# Patient Record
Sex: MALE | Race: WHITE | Employment: UNEMPLOYED | URBAN - METROPOLITAN AREA
[De-identification: names, ages, dates, MRNs, and addresses within clinical notes are randomized per-mention and may not be internally consistent; named-entity substitution may affect disease eponyms.]

---

## 2021-12-17 ENCOUNTER — APPOINTMENT (OUTPATIENT)
Dept: GENERAL RADIOLOGY | Age: 46
End: 2021-12-17
Payer: COMMERCIAL

## 2021-12-17 ENCOUNTER — HOSPITAL ENCOUNTER (EMERGENCY)
Age: 46
Discharge: OTHER INSTITUTION | End: 2021-12-17
Payer: COMMERCIAL

## 2021-12-17 VITALS
OXYGEN SATURATION: 99 % | BODY MASS INDEX: 28.6 KG/M2 | WEIGHT: 230 LBS | SYSTOLIC BLOOD PRESSURE: 134 MMHG | RESPIRATION RATE: 14 BRPM | HEART RATE: 71 BPM | HEIGHT: 75 IN | TEMPERATURE: 97 F | DIASTOLIC BLOOD PRESSURE: 89 MMHG

## 2021-12-17 DIAGNOSIS — S62.336A CLOSED DISPLACED FRACTURE OF NECK OF FIFTH METACARPAL BONE OF RIGHT HAND, INITIAL ENCOUNTER: ICD-10-CM

## 2021-12-17 DIAGNOSIS — S52.021A CLOSED OLECRANON FRACTURE, RIGHT, INITIAL ENCOUNTER: Primary | ICD-10-CM

## 2021-12-17 DIAGNOSIS — M25.421 EFFUSION, RIGHT ELBOW: ICD-10-CM

## 2021-12-17 DIAGNOSIS — S52.001A CLOSED FRACTURE OF PROXIMAL END OF RIGHT ULNA, UNSPECIFIED FRACTURE MORPHOLOGY, INITIAL ENCOUNTER: ICD-10-CM

## 2021-12-17 PROCEDURE — 99283 EMERGENCY DEPT VISIT LOW MDM: CPT

## 2021-12-17 PROCEDURE — 73080 X-RAY EXAM OF ELBOW: CPT

## 2021-12-17 PROCEDURE — 6370000000 HC RX 637 (ALT 250 FOR IP): Performed by: PHYSICIAN ASSISTANT

## 2021-12-17 PROCEDURE — 29105 APPLICATION LONG ARM SPLINT: CPT

## 2021-12-17 PROCEDURE — 73130 X-RAY EXAM OF HAND: CPT

## 2021-12-17 RX ORDER — ACETAMINOPHEN 325 MG/1
650 TABLET ORAL ONCE
Status: COMPLETED | OUTPATIENT
Start: 2021-12-17 | End: 2021-12-17

## 2021-12-17 RX ORDER — IBUPROFEN 800 MG/1
800 TABLET ORAL ONCE
Status: COMPLETED | OUTPATIENT
Start: 2021-12-17 | End: 2021-12-17

## 2021-12-17 RX ADMIN — IBUPROFEN 800 MG: 800 TABLET, FILM COATED ORAL at 21:23

## 2021-12-17 RX ADMIN — ACETAMINOPHEN 650 MG: 325 TABLET ORAL at 21:23

## 2021-12-17 ASSESSMENT — PAIN DESCRIPTION - LOCATION: LOCATION: ELBOW

## 2021-12-17 ASSESSMENT — PAIN SCALES - GENERAL: PAINLEVEL_OUTOF10: 7

## 2021-12-17 ASSESSMENT — PAIN DESCRIPTION - PAIN TYPE: TYPE: ACUTE PAIN

## 2021-12-17 ASSESSMENT — PAIN DESCRIPTION - ORIENTATION: ORIENTATION: RIGHT

## 2021-12-18 NOTE — ED NOTES
Perfect serve was placed to Dr. Cecilia Cox. He responded with he was not on call for [facility]. A call was then placed to [address] [place] [address]. Answering service states Dr. Bernabe Rangel is on call will page him.      Rosina Calderon  12/17/21 2967

## 2021-12-18 NOTE — ED NOTES
OCL and sling applied to patient's right arm     Minor Ghotra  12/17/21 [facility]  12/17/21 0014

## 2021-12-18 NOTE — ED PROVIDER NOTES
[address] ED  EMERGENCY DEPARTMENT ENCOUNTER        Pt Name: Lashonda Ivy  MRN: [id number]  [place] 1975  Date of evaluation: 12/17/2021  Provider: Amparo Jenkins PA-C  PCP: Samira Montanez MD    Shared Visit or Autonomous Visit: YUKI. I have evaluated this patient. My supervising physician was available for consultation. CHIEF COMPLAINT       Chief Complaint   Patient presents with    Elbow Injury     from snf, fell yesterday playing basketball. xray comfirms fracture at the snf, here for a splint, right elbow       HISTORY OF PRESENT ILLNESS   (Location/Symptom, Timing/Onset, Context/Setting, Quality, Duration, Modifying Factors, Severity)  Note limiting factors. Lashonda Ivy is a 55 y.o. male presenting to the emergency department for evaluation of right elbow and right hand injury that occurred yesterday after he fell while playing basketball. He is from the snf arrived with officer. Has a large amount of swelling to the posterior elbow. Bruising. No open wounds. Also tenderness bruising swelling to the right hand over fifth metacarpal with deformity. Neurovascularly intact. The history is provided by the patient. Arm Injury  Location:  Elbow and hand  Elbow location:  R elbow  Hand location:  R hand  Injury: yes    Time since incident:  1 day  Mechanism of injury: fall    Pain details:     Onset quality:  Sudden  Worsened by: Movement (plapation)  Associated symptoms: swelling    Associated symptoms: no fever    Hand Problem  Location:  Hand  Hand location:  R hand  Injury: yes    Pain details:     Onset quality:  Sudden    Duration:  1 day  Associated symptoms: swelling    Associated symptoms: no fever        Nursing Notes were reviewed    REVIEW OF SYSTEMS    (2-9 systems for level 4, 10 or more for level 5)     Review of Systems   Constitutional: Negative for fever. Musculoskeletal:        Rt elbow and rt hand pain   Skin: Negative for wound. Neurological: Negative for numbness. Positives and Pertinent negatives as per HPI. PAST MEDICAL HISTORY   History reviewed. No pertinent past medical history. SURGICAL HISTORY   History reviewed. No pertinent surgical history. CURRENTMEDICATIONS       There are no discharge medications for this patient. ALLERGIES     Patient has no known allergies. FAMILYHISTORY     History reviewed. No pertinent family history. SOCIAL HISTORY       Social History     Socioeconomic History    Marital status:      Spouse name: None    Number of children: None    Years of education: None    Highest education level: None   Occupational History    None   Tobacco Use    Smoking status: Current Every Day Smoker     Packs/day: 1.00    Smokeless tobacco: None   Substance and Sexual Activity    Alcohol use: None    Drug use: None    Sexual activity: None   Other Topics Concern    None   Social History Narrative    None     Social Determinants of Health     Financial Resource Strain:     Difficulty of Paying Living Expenses: Not on file   Food Insecurity:     Worried About Running Out of Food in the Last Year: Not on file    Fuad of Food in the Last Year: Not on file   Transportation Needs:     Lack of Transportation (Medical): Not on file    Lack of Transportation (Non-Medical):  Not on file   Physical Activity:     Days of Exercise per Week: Not on file    Minutes of Exercise per Session: Not on file   Stress:     Feeling of Stress : Not on file   Social Connections:     Frequency of Communication with Friends and Family: Not on file    Frequency of Social Gatherings with Friends and Family: Not on file    Attends Latter day Services: Not on file    Active Member of Clubs or Organizations: Not on file    Attends Club or Organization Meetings: Not on file    Marital Status: Not on file   Intimate Partner Violence:     Fear of Current or Ex-Partner: Not on file   [employer] dictation. EKG: All EKG's are interpreted by the Emergency Department Physician in the absence of a cardiologist.  Please see their note for interpretation of EKG. RADIOLOGY:   Non-plain film images such as CT, Ultrasound and MRI are read by the radiologist. Plain radiographic images are visualized andpreliminarily interpreted by the  ED Provider with the below findings:        Interpretation perthe Radiologist below, if available at the time of this note:    XR HAND RIGHT (MIN 3 VIEWS)   Final Result   Traumatic, acute intra-articular fracture of the proximal ulna with mild   displacement. Traumatic, acute comminuted fracture of the 5th metacarpal neck/shaft. XR ELBOW RIGHT (MIN 3 VIEWS)   Final Result   Traumatic, acute intra-articular fracture of the proximal ulna with mild   displacement. Traumatic, acute comminuted fracture of the 5th metacarpal neck/shaft. XR ELBOW RIGHT (MIN 3 VIEWS)    Result Date: 12/17/2021  EXAMINATION: THREE XRAY VIEWS OF THE RIGHT ELBOW; THREE XRAY VIEWS OF THE RIGHT HAND 12/17/2021 8:53 pm COMPARISON: None. HISTORY: ORDERING SYSTEM PROVIDED HISTORY: trauma TECHNOLOGIST PROVIDED HISTORY: Reason for exam:->trauma Reason for Exam: playing basket ball injury FINDINGS: Right elbow- There is a fracture of the olecranon process with diastasis of approximately 8 mm. Elbow joint effusion is noted. Posterior soft tissue swelling is also present. Right hand- There is a comminuted fracture of the 5th metacarpal neck and distal shaft. There is mild apex volar angulation. Traumatic, acute intra-articular fracture of the proximal ulna with mild displacement. Traumatic, acute comminuted fracture of the 5th metacarpal neck/shaft. XR HAND RIGHT (MIN 3 VIEWS)    Result Date: 12/17/2021  EXAMINATION: THREE XRAY VIEWS OF THE RIGHT ELBOW; THREE XRAY VIEWS OF THE RIGHT HAND 12/17/2021 8:53 pm COMPARISON: None.  HISTORY: ORDERING SYSTEM PROVIDED HISTORY: trauma TECHNOLOGIST PROVIDED HISTORY: Reason for exam:->trauma Reason for Exam: playing basket ball injury FINDINGS: Right elbow- There is a fracture of the olecranon process with diastasis of approximately 8 mm. Elbow joint effusion is noted. Posterior soft tissue swelling is also present. Right hand- There is a comminuted fracture of the 5th metacarpal neck and distal shaft. There is mild apex volar angulation. Traumatic, acute intra-articular fracture of the proximal ulna with mild displacement. Traumatic, acute comminuted fracture of the 5th metacarpal neck/shaft. PROCEDURES   Unless otherwise noted below, none     Procedures    CRITICAL CARE TIME   N/A    CONSULTS:  None      EMERGENCY DEPARTMENT COURSE and DIFFERENTIAL DIAGNOSIS/MDM:   Vitals:    Vitals:    12/17/21 2028   BP: 134/89   Pulse: 71   Resp: 14   Temp: 97 °F (36.1 °C)   TempSrc: Temporal   SpO2: 99%   Weight: 230 lb (104.3 kg)   Height: 6' 3\" (1.905 m)       Patient was given thefollowing medications:  Medications   ibuprofen (ADVIL;MOTRIN) tablet 800 mg (800 mg Oral Given 12/17/21 2123)   acetaminophen (TYLENOL) tablet 650 mg (650 mg Oral Given 12/17/21 2123)         ED course  Patient presented to the ER for evaluation of right arm injury. He has large amount of swelling to the posterior elbow with bruise. No open wounds. Neurovascularly intact. He also has injury to his right hand with swelling and deformity over fifth metacarpal.  Closed. X-ray showing olecranon fracture with approximately 8 mm diastases. And comminuted fracture fifth metacarpal.    10:46 PM EST  Spoke with orthopedics on-call have reviewed films recommended padded posterior splint and sling and follow-up with Dr. Jacqueline Grace or Mala Beltran. Splint was applied by ER tech this was checked afterwards is in good position and neurovascular intact. Referral provided for follow-up. Advise rest ice and elevate Tylenol and ibuprofen for pain return for worsening.       I estimate there is LOW risk for COMPARTMENT SYNDROME, DEEP VENOUS THROMBOSIS, SEPTIC ARTHRITIS, TENDON OR NEUROVASCULAR INJURY, thus I consider the discharge disposition reasonable. FINAL IMPRESSION      1. Closed olecranon fracture, right, initial encounter    2. Closed displaced fracture of neck of fifth metacarpal bone of right hand, initial encounter    3. Effusion, right elbow    4. Closed fracture of proximal end of right ulna, unspecified fracture morphology, initial encounter          DISPOSITION/PLAN   DISPOSITION Decision to Discharge    PATIENT REFERREDTO:  Regina Savage MD  [place] [address]  [phone]    Schedule an appointment as soon as possible for a visit   [facility] orthopedics    [facility] ED  [address]  [phone]    If symptoms worsen      DISCHARGE MEDICATIONS:  There are no discharge medications for this patient. DISCONTINUED MEDICATIONS:  There are no discharge medications for this patient.              (Please note that portions ofthis note were completed with a voice recognition program.  Efforts were made to edit the dictations but occasionally words are mis-transcribed.)    Nicki Reid PA-C (electronically signed)            Radha Banegas PA-C  12/18/21 5009

## 2021-12-20 ENCOUNTER — TELEPHONE (OUTPATIENT)
Dept: ORTHOPEDIC SURGERY | Age: 46
End: 2021-12-20

## 2021-12-20 NOTE — TELEPHONE ENCOUNTER
Patient is currently in half-way as of ER note and SMA. We are trying to reach patient to schedule. Tried to call emergency contact but number was not reachable as it is not in service. **Upon call from half-way. Please schedule patient for SMA 12/21/2021 or 12/23/2021 at 4 pm (okay to overbook).  **

## 2021-12-23 ENCOUNTER — OFFICE VISIT (OUTPATIENT)
Dept: ORTHOPEDIC SURGERY | Age: 46
End: 2021-12-23
Payer: COMMERCIAL

## 2021-12-23 VITALS — HEIGHT: 75 IN | BODY MASS INDEX: 28.6 KG/M2 | WEIGHT: 230 LBS

## 2021-12-23 DIAGNOSIS — S52.021A OLECRANON FRACTURE, RIGHT, CLOSED, INITIAL ENCOUNTER: Primary | ICD-10-CM

## 2021-12-23 PROCEDURE — 99244 OFF/OP CNSLTJ NEW/EST MOD 40: CPT | Performed by: ORTHOPAEDIC SURGERY

## 2021-12-23 NOTE — PATIENT INSTRUCTIONS
You are to be scheduled for surgery. My Patient , Chaitanya Pearce will be facilitating the scheduling process. You may continue normal activities up until the time of your surgery. Please call Shaw Terrazasy at [phone] if you have any questions regarding the scheduled procedure.

## 2021-12-23 NOTE — PROGRESS NOTES
Occupational History    None   Tobacco Use    Smoking status: Current Every Day Smoker     Packs/day: 1.00    Smokeless tobacco: Never Used   Substance and Sexual Activity    Alcohol use: None    Drug use: None    Sexual activity: None   Other Topics Concern    None   Social History Narrative    None     Social Determinants of Health     Financial Resource Strain:     Difficulty of Paying Living Expenses: Not on file   Food Insecurity:     Worried About Running Out of Food in the Last Year: Not on file    Fuad of Food in the Last Year: Not on file   Transportation Needs:     Lack of Transportation (Medical): Not on file    Lack of Transportation (Non-Medical):  Not on file   Physical Activity:     Days of Exercise per Week: Not on file    Minutes of Exercise per Session: Not on file   Stress:     Feeling of Stress : Not on file   Social Connections:     Frequency of Communication with Friends and Family: Not on file    Frequency of Social Gatherings with Friends and Family: Not on file    Attends Evangelical Services: Not on file    Active Member of [address] [place] [address] or Organizations: Not on file    Attends Club or Organization Meetings: Not on file    Marital Status: Not on file   Intimate Partner Violence:     Fear of Current or Ex-Partner: Not on file    Emotionally Abused: Not on file    Physically Abused: Not on file    Sexually Abused: Not on file   Housing Stability:     Unable to Pay for Housing in the Last Year: Not on file    Number of [place] in the Last Year: Not on file    Unstable Housing in the Last Year: Not on file       REVIEW OF SYSTEMS   General: no fever, chills, night sweats, anorexia, malaise, fatigue, or weight change  Hematologic:  no unexplained bleeding or bruising  HEENT:   no nasal congestion, rhinorrhea, sore throat, or facial pain  Respiratory:  no cough, dyspnea, or chest pain  Cardiovascular:  no angina, PETERSEN, PND, orthopnea, dependent edema, or palpitations  Gastrointestinal:  no nausea, vomiting, diarrhea, constipation, or abdominal pain  Genitourinary:  no urinary urgency, frequency, dysuria, or hematuria  Musculoskeletal: see HPI  Endocrine:  no heat or cold intolerance and no polyphagia, polydipsia, or polyuria  Skin:  no skin eruptions or changing lesions  Neurologic:  no focal weakness, numbness/tingling, tremor, or severe headache. See HPI. See HPI for pertinent positives. PHYSICAL EXAM   Vital Signs:   Vitals:    12/23/21 1318   Weight: 230 lb (104.3 kg)   Height: 6' 3\" (1.905 m)       General appearance: healthy, alert, no distress  Skin: Skin color, texture, turgor normal. No rashes or lesions  HEENT: atraumatic, normocephalic. PERRL  Respiratory: Unlabored breathing  Lymphatic: No adenopathy   Neuro: Alert and oriented, normal distal sensation, normal bilateral DTRs  Vascular: Normal distal capillary and distal pulses  Muskuloskeletal Exam: Right upper extremity examination feels diffuse soft tissue swelling and ecchymosis with tenderness at the olecranon process and a palpable defect at the known fracture site. He has some mild boxer fracture deformity of the right hand without tenderness to palpation in the 5th metacarpal neck    RADIOLOGY   X-rays obtained and reviewed in office:  Views right elbow 3 views and right hand 3 views    Impression: There is an acute displaced fracture of the olecranon process with proximal displacement of about 1 cm. There is a subacute fracture of the 5th metacarpal neck with mild angulation and abundant periosteal new bone and callus formation     IMPRESSION     1. Olecranon fracture, right, closed, initial encounter         PLAN   I had a lengthy discussion with patient today regarding diagnosis and treatment options and recommendations. I believe the olecranon fracture will best be treated with open reduction and internal fixation.   We discussed the risk benefits complications possible outcomes and expected postoperative course. He understands would like to proceed. He was placed back in a long-arm posterior splint for temporary immobilization. We will make arrangements for definitive fracture management in the OR in the future. FOLLOWUP     Return for first post operative visit. No orders of the defined types were placed in this encounter. No orders of the defined types were placed in this encounter.       Patient was instructed on appropriate use of braces, participation in home exercise programs, healthy lifestyle choices and weight loss as appropriate     Tiara Adames MD

## 2021-12-23 NOTE — LETTER
[address] [employer]   DR. DAVID CASTREJON     TODAY'S DATE: 21    PATIENT'S NAME: Lazaro Fan    PATIENT'S NUMBER: <[id number]>    : 1975    PREFERRED PHONE NUMBER: [phone]      WORK PHONE NUMBER: There is no work phone number on file. DIAGNOSIS:   1.  Olecranon fracture, right, closed, initial encounter        PROCEDURE: Open reduction internal fixation right olecranon process fracture    SURGEON: Dr. Pierce Douds: Urgent    HOSPITAL: [facility]: Outpatient/Same Day Surgery    ANESTHESIA: General   Pre-Op Block requested  Yes    LENGTH OF SURGERY: 1 hour    EQUIPMENT REQUESTED: Overwatch proximal ulna plate/hook plate, Overwatch locking small fragment set and Overwatch cannulated screws      X-RAYS REQUIRED: C-ARM      PCP: Hi Truong MD    H&P TO BE DONE BY THE PCP      CARDIAC CLEARANCE NEEDED: No     ALLERGIES: No Known Allergies    DME: none    POST-OP VISIT: 10 Days    OTHER IN[facility]REMARKS[facility]    INSURANCE INFORMATION: _________________________ CARD IN MEDIA IN EPIC___  CARD FAXED___    PRE-CERTIFICATION REQUIRED: YES   NO   PER _______________________    SURGERY SCHEDULED BY: ____________________________________    PATIENT CALLED AND CONFIRMED DATE & TIME: ______________________

## 2021-12-23 NOTE — LETTER
[address]   INFORMED CONSENT FOR SURGICAL PROCEDURES     I, Lazaro Fan, hereby authorize Dr. Bevely Felty and/or such associates as may be selected by him/her to perform the following operations:   Open reduction internal fixation right olecranon process fracture    I hereby acknowledge that in giving this consent the nature and purpose of the procedure, alternative means of therapy, if any, and reasonably known risks, complications, and discomfort have been explained to me by the above named doctor(s). I authorize the above named doctor(s) to carry out an extension or to perform any other procedure that in her judgement is advisable on the basis of findings during the course of said operation upon the above named patient. I am aware that the practice of medicine and surgery is not an exact science and that the possibility and nature of results or complications cannot be anticipated with complete accuracy. I acknowledge that no guarantees, express or implied, have been made as to the results of the above described procedure or any cure. I acknowledge that disclosure of information has been made to me regarding the nature and purpose of the operation together with the reasonably known risks and that all questions I have asked about the procedure have been answered in a satisfactory manner.           Dr. Bevely Felty       __________________________________  Patient/Guardian or Spouse Signature     __________________________________  Witness   12/23/21 1:54 PM

## 2022-01-07 ENCOUNTER — TELEPHONE (OUTPATIENT)
Dept: ORTHOPEDIC SURGERY | Age: 47
End: 2022-01-07

## 2022-01-07 NOTE — TELEPHONE ENCOUNTER
Patient is currently an inmate at [facility]. There is no way to contact this patient regarding insurance. Precert made aware.  KB

## 2022-01-07 NOTE — TELEPHONE ENCOUNTER
PER PSS, PATIENT IS A [facility] INMATE  CANNOT OBTAIN INS CARD  [id number]  EG  NO PRECERT COULD BE OBTAINED

## 2022-01-10 ENCOUNTER — ANESTHESIA EVENT (OUTPATIENT)
Dept: OPERATING ROOM | Age: 47
End: 2022-01-10
Payer: COMMERCIAL

## 2022-01-11 ENCOUNTER — TELEPHONE (OUTPATIENT)
Dept: ORTHOPEDIC SURGERY | Age: 47
End: 2022-01-11

## 2022-01-12 ENCOUNTER — HOSPITAL ENCOUNTER (OUTPATIENT)
Age: 47
Setting detail: OUTPATIENT SURGERY
Discharge: HOME | End: 2022-01-12
Attending: ORTHOPAEDIC SURGERY | Admitting: ORTHOPAEDIC SURGERY
Payer: COMMERCIAL

## 2022-01-12 ENCOUNTER — ANESTHESIA (OUTPATIENT)
Dept: OPERATING ROOM | Age: 47
End: 2022-01-12
Payer: COMMERCIAL

## 2022-01-12 VITALS
OXYGEN SATURATION: 96 % | RESPIRATION RATE: 18 BRPM | BODY MASS INDEX: 28.6 KG/M2 | WEIGHT: 230 LBS | TEMPERATURE: 98.7 F | HEART RATE: 68 BPM | HEIGHT: 75 IN | DIASTOLIC BLOOD PRESSURE: 60 MMHG | SYSTOLIC BLOOD PRESSURE: 100 MMHG

## 2022-01-12 VITALS
SYSTOLIC BLOOD PRESSURE: 87 MMHG | TEMPERATURE: 97.9 F | RESPIRATION RATE: 13 BRPM | DIASTOLIC BLOOD PRESSURE: 54 MMHG | OXYGEN SATURATION: 99 %

## 2022-01-12 DIAGNOSIS — S52.021A OLECRANON FRACTURE, RIGHT, CLOSED, INITIAL ENCOUNTER: Primary | ICD-10-CM

## 2022-01-12 PROCEDURE — 64415 NJX AA&/STRD BRCH PLXS IMG: CPT | Performed by: ANESTHESIOLOGY

## 2022-01-12 PROCEDURE — 2500000003 HC RX 250 WO HCPCS: Performed by: NURSE ANESTHETIST, CERTIFIED REGISTERED

## 2022-01-12 PROCEDURE — 2500000003 HC RX 250 WO HCPCS: Performed by: ANESTHESIOLOGY

## 2022-01-12 PROCEDURE — 3600000014 HC SURGERY LEVEL 4 ADDTL 15MIN: Performed by: ORTHOPAEDIC SURGERY

## 2022-01-12 PROCEDURE — 2709999900 HC NON-CHARGEABLE SUPPLY: Performed by: ORTHOPAEDIC SURGERY

## 2022-01-12 PROCEDURE — 2720000010 HC SURG SUPPLY STERILE: Performed by: ORTHOPAEDIC SURGERY

## 2022-01-12 PROCEDURE — 3700000001 HC ADD 15 MINUTES (ANESTHESIA): Performed by: ORTHOPAEDIC SURGERY

## 2022-01-12 PROCEDURE — 3700000000 HC ANESTHESIA ATTENDED CARE: Performed by: ORTHOPAEDIC SURGERY

## 2022-01-12 PROCEDURE — 24685 OPTX ULNAR FX PROX END W/FIX: CPT | Performed by: ORTHOPAEDIC SURGERY

## 2022-01-12 PROCEDURE — 6360000002 HC RX W HCPCS: Performed by: ORTHOPAEDIC SURGERY

## 2022-01-12 PROCEDURE — 3600000004 HC SURGERY LEVEL 4 BASE: Performed by: ORTHOPAEDIC SURGERY

## 2022-01-12 PROCEDURE — 7100000011 HC PHASE II RECOVERY - ADDTL 15 MIN: Performed by: ORTHOPAEDIC SURGERY

## 2022-01-12 PROCEDURE — C1776 JOINT DEVICE (IMPLANTABLE): HCPCS | Performed by: ORTHOPAEDIC SURGERY

## 2022-01-12 PROCEDURE — 6360000002 HC RX W HCPCS: Performed by: NURSE ANESTHETIST, CERTIFIED REGISTERED

## 2022-01-12 PROCEDURE — C1713 ANCHOR/SCREW BN/BN,TIS/BN: HCPCS | Performed by: ORTHOPAEDIC SURGERY

## 2022-01-12 PROCEDURE — C1769 GUIDE WIRE: HCPCS | Performed by: ORTHOPAEDIC SURGERY

## 2022-01-12 PROCEDURE — 7100000001 HC PACU RECOVERY - ADDTL 15 MIN: Performed by: ORTHOPAEDIC SURGERY

## 2022-01-12 PROCEDURE — 7100000000 HC PACU RECOVERY - FIRST 15 MIN: Performed by: ORTHOPAEDIC SURGERY

## 2022-01-12 PROCEDURE — 7100000010 HC PHASE II RECOVERY - FIRST 15 MIN: Performed by: ORTHOPAEDIC SURGERY

## 2022-01-12 PROCEDURE — 2580000003 HC RX 258: Performed by: ANESTHESIOLOGY

## 2022-01-12 DEVICE — IMPLANTABLE DEVICE: Type: IMPLANTABLE DEVICE | Site: ELBOW | Status: FUNCTIONAL

## 2022-01-12 DEVICE — WASHER ORTH DIA13MM FOR CANN SCR: Type: IMPLANTABLE DEVICE | Site: ELBOW | Status: FUNCTIONAL

## 2022-01-12 RX ORDER — FENTANYL CITRATE 50 UG/ML
INJECTION, SOLUTION INTRAMUSCULAR; INTRAVENOUS PRN
Status: DISCONTINUED | OUTPATIENT
Start: 2022-01-12 | End: 2022-01-12 | Stop reason: SDUPTHER

## 2022-01-12 RX ORDER — FENTANYL CITRATE 50 UG/ML
INJECTION, SOLUTION INTRAMUSCULAR; INTRAVENOUS
Status: DISCONTINUED
Start: 2022-01-12 | End: 2022-01-12 | Stop reason: HOSPADM

## 2022-01-12 RX ORDER — MORPHINE SULFATE 2 MG/ML
2 INJECTION, SOLUTION INTRAMUSCULAR; INTRAVENOUS EVERY 5 MIN PRN
Status: DISCONTINUED | OUTPATIENT
Start: 2022-01-12 | End: 2022-01-12 | Stop reason: HOSPADM

## 2022-01-12 RX ORDER — SODIUM CHLORIDE 0.9 % (FLUSH) 0.9 %
10 SYRINGE (ML) INJECTION EVERY 12 HOURS SCHEDULED
Status: DISCONTINUED | OUTPATIENT
Start: 2022-01-12 | End: 2022-01-12 | Stop reason: HOSPADM

## 2022-01-12 RX ORDER — BUPIVACAINE HYDROCHLORIDE 2.5 MG/ML
INJECTION, SOLUTION EPIDURAL; INFILTRATION; INTRACAUDAL
Status: DISCONTINUED
Start: 2022-01-12 | End: 2022-01-12 | Stop reason: WASHOUT

## 2022-01-12 RX ORDER — DEXAMETHASONE SODIUM PHOSPHATE 4 MG/ML
INJECTION, SOLUTION INTRA-ARTICULAR; INTRALESIONAL; INTRAMUSCULAR; INTRAVENOUS; SOFT TISSUE PRN
Status: DISCONTINUED | OUTPATIENT
Start: 2022-01-12 | End: 2022-01-12 | Stop reason: SDUPTHER

## 2022-01-12 RX ORDER — BUPIVACAINE HYDROCHLORIDE 5 MG/ML
INJECTION, SOLUTION EPIDURAL; INTRACAUDAL
Status: DISCONTINUED
Start: 2022-01-12 | End: 2022-01-12 | Stop reason: HOSPADM

## 2022-01-12 RX ORDER — OXYCODONE HYDROCHLORIDE AND ACETAMINOPHEN 5; 325 MG/1; MG/1
2 TABLET ORAL PRN
Status: DISCONTINUED | OUTPATIENT
Start: 2022-01-12 | End: 2022-01-12 | Stop reason: HOSPADM

## 2022-01-12 RX ORDER — MORPHINE SULFATE 2 MG/ML
1 INJECTION, SOLUTION INTRAMUSCULAR; INTRAVENOUS EVERY 5 MIN PRN
Status: DISCONTINUED | OUTPATIENT
Start: 2022-01-12 | End: 2022-01-12 | Stop reason: HOSPADM

## 2022-01-12 RX ORDER — HYDRALAZINE HYDROCHLORIDE 20 MG/ML
5 INJECTION INTRAMUSCULAR; INTRAVENOUS EVERY 10 MIN PRN
Status: DISCONTINUED | OUTPATIENT
Start: 2022-01-12 | End: 2022-01-12 | Stop reason: HOSPADM

## 2022-01-12 RX ORDER — MIDAZOLAM HYDROCHLORIDE 1 MG/ML
INJECTION INTRAMUSCULAR; INTRAVENOUS PRN
Status: DISCONTINUED | OUTPATIENT
Start: 2022-01-12 | End: 2022-01-12 | Stop reason: SDUPTHER

## 2022-01-12 RX ORDER — LABETALOL HYDROCHLORIDE 5 MG/ML
5 INJECTION, SOLUTION INTRAVENOUS EVERY 10 MIN PRN
Status: DISCONTINUED | OUTPATIENT
Start: 2022-01-12 | End: 2022-01-12 | Stop reason: HOSPADM

## 2022-01-12 RX ORDER — GLYCOPYRROLATE 0.2 MG/ML
INJECTION INTRAMUSCULAR; INTRAVENOUS PRN
Status: DISCONTINUED | OUTPATIENT
Start: 2022-01-12 | End: 2022-01-12 | Stop reason: SDUPTHER

## 2022-01-12 RX ORDER — SODIUM CHLORIDE, SODIUM LACTATE, POTASSIUM CHLORIDE, CALCIUM CHLORIDE 600; 310; 30; 20 MG/100ML; MG/100ML; MG/100ML; MG/100ML
INJECTION, SOLUTION INTRAVENOUS CONTINUOUS
Status: DISCONTINUED | OUTPATIENT
Start: 2022-01-12 | End: 2022-01-12 | Stop reason: HOSPADM

## 2022-01-12 RX ORDER — SODIUM CHLORIDE 0.9 % (FLUSH) 0.9 %
10 SYRINGE (ML) INJECTION PRN
Status: DISCONTINUED | OUTPATIENT
Start: 2022-01-12 | End: 2022-01-12 | Stop reason: HOSPADM

## 2022-01-12 RX ORDER — PROPOFOL 10 MG/ML
INJECTION, EMULSION INTRAVENOUS PRN
Status: DISCONTINUED | OUTPATIENT
Start: 2022-01-12 | End: 2022-01-12 | Stop reason: SDUPTHER

## 2022-01-12 RX ORDER — PROMETHAZINE HYDROCHLORIDE 25 MG/ML
6.25 INJECTION, SOLUTION INTRAMUSCULAR; INTRAVENOUS
Status: DISCONTINUED | OUTPATIENT
Start: 2022-01-12 | End: 2022-01-12 | Stop reason: SDUPTHER

## 2022-01-12 RX ORDER — DIPHENHYDRAMINE HYDROCHLORIDE 50 MG/ML
12.5 INJECTION INTRAMUSCULAR; INTRAVENOUS
Status: DISCONTINUED | OUTPATIENT
Start: 2022-01-12 | End: 2022-01-12 | Stop reason: HOSPADM

## 2022-01-12 RX ORDER — MIDAZOLAM HYDROCHLORIDE 1 MG/ML
INJECTION INTRAMUSCULAR; INTRAVENOUS
Status: DISCONTINUED
Start: 2022-01-12 | End: 2022-01-12 | Stop reason: HOSPADM

## 2022-01-12 RX ORDER — HYDROCODONE BITARTRATE AND ACETAMINOPHEN 5; 325 MG/1; MG/1
1 TABLET ORAL EVERY 4 HOURS PRN
Qty: 42 TABLET | Refills: 0 | Status: SHIPPED | OUTPATIENT
Start: 2022-01-12 | End: 2022-01-19

## 2022-01-12 RX ORDER — LIDOCAINE HYDROCHLORIDE 20 MG/ML
INJECTION, SOLUTION EPIDURAL; INFILTRATION; INTRACAUDAL; PERINEURAL PRN
Status: DISCONTINUED | OUTPATIENT
Start: 2022-01-12 | End: 2022-01-12 | Stop reason: SDUPTHER

## 2022-01-12 RX ORDER — OXYCODONE HYDROCHLORIDE AND ACETAMINOPHEN 5; 325 MG/1; MG/1
1 TABLET ORAL PRN
Status: DISCONTINUED | OUTPATIENT
Start: 2022-01-12 | End: 2022-01-12 | Stop reason: HOSPADM

## 2022-01-12 RX ORDER — SODIUM CHLORIDE 9 MG/ML
25 INJECTION, SOLUTION INTRAVENOUS PRN
Status: DISCONTINUED | OUTPATIENT
Start: 2022-01-12 | End: 2022-01-12 | Stop reason: HOSPADM

## 2022-01-12 RX ORDER — ONDANSETRON 2 MG/ML
4 INJECTION INTRAMUSCULAR; INTRAVENOUS PRN
Status: DISCONTINUED | OUTPATIENT
Start: 2022-01-12 | End: 2022-01-12 | Stop reason: HOSPADM

## 2022-01-12 RX ADMIN — PROPOFOL 200 MG: 10 INJECTION, EMULSION INTRAVENOUS at 08:28

## 2022-01-12 RX ADMIN — FENTANYL CITRATE 100 MCG: 50 INJECTION INTRAMUSCULAR; INTRAVENOUS at 08:28

## 2022-01-12 RX ADMIN — LIDOCAINE HYDROCHLORIDE 60 MG: 20 INJECTION, SOLUTION EPIDURAL; INFILTRATION; INTRACAUDAL; PERINEURAL at 08:28

## 2022-01-12 RX ADMIN — Medication 2000 MG: at 08:35

## 2022-01-12 RX ADMIN — FENTANYL CITRATE 100 MCG: 50 INJECTION INTRAMUSCULAR; INTRAVENOUS at 07:56

## 2022-01-12 RX ADMIN — MIDAZOLAM 2 MG: 1 INJECTION INTRAMUSCULAR; INTRAVENOUS at 08:28

## 2022-01-12 RX ADMIN — SODIUM CHLORIDE, POTASSIUM CHLORIDE, SODIUM LACTATE AND CALCIUM CHLORIDE: 600; 310; 30; 20 INJECTION, SOLUTION INTRAVENOUS at 07:22

## 2022-01-12 RX ADMIN — PHENYLEPHRINE HYDROCHLORIDE 100 MCG: 10 INJECTION INTRAVENOUS at 09:02

## 2022-01-12 RX ADMIN — DEXAMETHASONE SODIUM PHOSPHATE 8 MG: 4 INJECTION, SOLUTION INTRAMUSCULAR; INTRAVENOUS at 08:56

## 2022-01-12 RX ADMIN — MIDAZOLAM 2 MG: 1 INJECTION INTRAMUSCULAR; INTRAVENOUS at 07:56

## 2022-01-12 RX ADMIN — GLYCOPYRROLATE 0.2 MG: 0.2 INJECTION, SOLUTION INTRAMUSCULAR; INTRAVENOUS at 08:47

## 2022-01-12 RX ADMIN — PHENYLEPHRINE HYDROCHLORIDE 100 MCG: 10 INJECTION INTRAVENOUS at 08:56

## 2022-01-12 RX ADMIN — PHENYLEPHRINE HYDROCHLORIDE 100 MCG: 10 INJECTION INTRAVENOUS at 08:49

## 2022-01-12 RX ADMIN — FAMOTIDINE 20 MG: 10 INJECTION, SOLUTION INTRAVENOUS at 07:23

## 2022-01-12 RX ADMIN — LIDOCAINE HYDROCHLORIDE 0.1 ML: 10 INJECTION, SOLUTION EPIDURAL; INFILTRATION; INTRACAUDAL; PERINEURAL at 07:22

## 2022-01-12 ASSESSMENT — PULMONARY FUNCTION TESTS
PIF_VALUE: 2
PIF_VALUE: 15
PIF_VALUE: 2
PIF_VALUE: 17
PIF_VALUE: 2
PIF_VALUE: 2
PIF_VALUE: 3
PIF_VALUE: 1
PIF_VALUE: 2
PIF_VALUE: 3
PIF_VALUE: 2
PIF_VALUE: 0
PIF_VALUE: 18
PIF_VALUE: 2
PIF_VALUE: 17
PIF_VALUE: 3
PIF_VALUE: 27
PIF_VALUE: 2
PIF_VALUE: 21
PIF_VALUE: 2
PIF_VALUE: 16
PIF_VALUE: 14
PIF_VALUE: 2
PIF_VALUE: 6
PIF_VALUE: 0
PIF_VALUE: 2
PIF_VALUE: 2
PIF_VALUE: 3
PIF_VALUE: 2
PIF_VALUE: 12
PIF_VALUE: 2
PIF_VALUE: 16
PIF_VALUE: 4
PIF_VALUE: 17
PIF_VALUE: 2
PIF_VALUE: 1
PIF_VALUE: 2
PIF_VALUE: 2
PIF_VALUE: 23
PIF_VALUE: 2

## 2022-01-12 ASSESSMENT — PAIN - FUNCTIONAL ASSESSMENT: PAIN_FUNCTIONAL_ASSESSMENT: 0-10

## 2022-01-12 NOTE — H&P
Date of Surgery Update:  Vik Delgado was seen, history and physical examination reviewed, and patient examined by me today. There have been no significant clinical changes since the completion of the previous history and physical.    The risk, benefits, and alternatives of the proposed procedure have been explained to the patient (or appropriate guardian) and understanding verbalized. All questions answered. Patient wishes to proceed.     Electronically signed by: Ino Kang MD,1/12/2022,8:17 AM

## 2022-01-12 NOTE — PROGRESS NOTES
Discharge  instructions reviewed. Pt and family verbalize understanding with no further questions. VSS. Pt discharged via [facility] to car. Assessment unchanged.

## 2022-01-12 NOTE — PROGRESS NOTES
Pt presents with [facility] - officer, pt from [facility]. Pt. checked in for procedure. Assessment complete. IV started. Safety check list complete. Officer at bedside with pt.

## 2022-01-12 NOTE — BRIEF OP NOTE
Brief Postoperative Note      Patient: Kristin Barnes  YOB: 1975  MRN: [id number]    Date of Procedure: 1/12/2022    Pre-Op Diagnosis: OLECRANON FRACTURE, RIGHT, CLOSED, INITIAL ENCOUNTERS    Post-Op Diagnosis: Same       Procedure(s):  OPEN REDUCTION INTERNAL FIXATION RIGHT OLECRANON PROCESS FRACTURE  --BLOCK--    Surgeon(s):  Puma Toscano MD    Assistant:  Surgical Assistant: Miranda Bess    Anesthesia: General    Estimated Blood Loss (mL): Minimal    Complications: None    Specimens:   * No specimens in log *    Implants:  * No implants in log *      Drains: * No LDAs found *    Findings: displaced right olecranon process fracture    Electronically signed by Puma Toscano MD on 1/12/2022 at 9:21 AM

## 2022-01-12 NOTE — ANESTHESIA POSTPROCEDURE EVALUATION
Department of Anesthesiology  Postprocedure Note    Patient: Kristin Barnes  MRN: [id number]  YOB: 1975  Date of evaluation: 1/12/2022  Time:  1:13 PM     Procedure Summary     Date: 01/12/22 Room / Location: [address] [place] [address] OR 01 / [facility]    Anesthesia Start: 6277 Anesthesia Stop: 2146    Procedure: OPEN REDUCTION INTERNAL FIXATION RIGHT OLECRANON PROCESS FRACTURE  - (Right Elbow) Diagnosis: (OLECRANON FRACTURE, RIGHT, CLOSED, INITIAL ENCOUNTERS)    Surgeons: Puma Toscano MD Responsible Provider: Alexx Julian MD    Anesthesia Type: general ASA Status: 1          Anesthesia Type: general    Vijay Phase I: Vijay Score: 9    Vijay Phase II: Vijay Score: 10    Last vitals: Reviewed and per EMR flowsheets.        Anesthesia Post Evaluation    Comments: Postoperative Anesthesia Note    Name:    Kristin Barnes  MRN:      [id number]    Patient Vitals in the past 12 hrs:  01/12/22 1003, BP:100/60, Pulse:68, Resp:18, SpO2:96 %  01/12/22 0948, BP:95/65, Temp:98.7 °F (37.1 °C), Temp src:Temporal, Pulse:62, Resp:18, SpO2:95 %  01/12/22 0936, BP:96/66, Pulse:66, Resp:18, SpO2:95 %  01/12/22 0931, BP:(!) 90/57, Pulse:60, Resp:18, SpO2:95 %  01/12/22 0926, BP:(!) 88/57, Temp:97.8 °F (36.6 °C), Temp src:Temporal, Pulse:64, Resp:18, SpO2:95 %  01/12/22 0804, BP:113/76, Pulse:51, Resp:16, SpO2:100 %  01/12/22 0759, Pulse:51, Resp:18, SpO2:100 %  01/12/22 0754, BP:119/82, Temp:98.7 °F (37.1 °C), Temp src:Temporal, Pulse:52, Resp:18, SpO2:100 %  01/12/22 0652, BP:118/85, Temp:98.6 °F (37 °C), Temp src:Temporal, Pulse:54, Resp:15, SpO2:99 %, Height:6' 3\" (1.905 m), Weight:230 lb (104.3 kg)     LABS:    CBC  No results found for: WBC, HGB, HCT, PLT  RENAL  Lab Results       Component                Value               Date/Time                  NA                       139                 01/18/2011 01:45 PM        K                        4.2                 01/18/2011 01:45 PM        CL 107                 01/18/2011 01:45 PM        CO2                      27                  01/18/2011 01:45 PM        BUN                      13                  01/18/2011 01:45 PM        CREATININE               1.0                 01/18/2011 01:45 PM        GLUCOSE                  100 (H)             01/18/2011 01:45 PM   COAGS  No results found for: PROTIME, INR, APTT    Intake & Output:  @35FIHH@    Nausea & Vomiting:  No    Level of Consciousness:  Awake    Pain Assessment:  Adequate analgesia    Anesthesia Complications:  No apparent anesthetic complications    SUMMARY      Vital signs stable  OK to discharge from Stage I post anesthesia care.   Care transferred from Anesthesiology department on discharge from perioperative area

## 2022-01-12 NOTE — OP NOTE
Operative Note      Patient: Neil Tariq  YOB: 1975  MRN: [id number]    Date of Procedure: 1/12/2022    Pre-Op Diagnosis: OLECRANON FRACTURE, RIGHT, CLOSED, INITIAL ENCOUNTERS    Post-Op Diagnosis: Same       Procedure(s):  OPEN REDUCTION INTERNAL FIXATION RIGHT OLECRANON PROCESS FRACTURE  -    Surgeon(s):  Poncho Alejandre MD    Assistant:   Surgical Assistant: Sarkis Orellana    Anesthesia: General    Estimated Blood Loss (mL): Minimal    Complications: None    Specimens:   * No specimens in log *    Implants:  * No implants in log *      Drains: * No LDAs found *    Findings: Displaced fracture right olecranon process    Detailed Description of Procedure: The patient was brought to the operating room placed supine on the operating room table where general anesthetic was administered by the anesthesia department. Tourniquet was placed on the right upper arm. The arm was prepped and draped in usual sterile fashion exsanguinated with an Esmarch and the tourniquet inflated to 250 mmHg. Incision was made on the posterior aspect of the arm beginning at the tip of the olecranon process extending distally for about 4 cm carried down sharply through skin subcutaneous tissues and fascia directly onto the fracture site at the olecranon process. Subperiosteal dissection was carried out to expose the fracture ends. Fracture hematoma was evacuated and the bone ends were cleaned and irrigated. The fracture was reduced with a fracture tenaculum. A guidepin for the Synthes 6.5 mm cannulated screws was inserted from the tip of the olecranon process was advanced across the fracture and advanced into the intramedullary canal of the ulnar shaft. C-arm was draped with a sterile drape moved into position and the fracture reduction was examined found to be nearly anatomic with congruous reduction of the articular surface of the olecranon process. The guidepin was then measured.   The pin was overdrilled with a cannulated drill. A 120 mm long threaded 6.5 mm cannulated screw with a 13 mm washer was then inserted over the guidepin and advanced across the fracture site and tightened to achieve compression at the fracture. The guidepin was removed. Final x-rays revealed satisfactory positioning of the hardware with excellent reduction of the fracture. The wound was then irrigated and closed with 0 Vicryl in the fascia 2-0 Vicryl in the subcutaneous tissues and a running 4-0 Monocryl suture in subcuticular fashion in the skin. Steri-Strips Adaptic dry sterile dressings and a well-padded long-arm posterior splint was applied to the arm. The tourniquet was deflated. The patient was awakened and transferred to the recovery room in good and having tolerated the procedure well.     Electronically signed by Mariola Lundy MD on 1/12/2022 at 9:36 AM

## 2022-01-12 NOTE — ANESTHESIA PROCEDURE NOTES
Peripheral Block    Patient location during procedure: pre-op  Staffing  Performed: anesthesiologist   Anesthesiologist: Elie Meyers MD  Preanesthetic Checklist  Completed: patient identified, IV checked, site marked, risks and benefits discussed, surgical consent, monitors and equipment checked, pre-op evaluation, timeout performed, anesthesia consent given, oxygen available and patient being monitored  Peripheral Block  Patient position: sitting  Prep: ChloraPrep  Patient monitoring: cardiac monitor, continuous pulse ox, frequent blood pressure checks and IV access  Block type: Brachial plexus  Laterality: right  Injection technique: single-shot  Guidance: ultrasound guided  Local infiltration: lidocaine  Infiltration strength: 1 %  Dose: 3 mL  Supraclavicular  Provider prep: mask and sterile gloves  Local infiltration: lidocaine  Needle  Needle gauge: 21 G  Needle length: 10 cm  Needle localization: ultrasound guidance  Assessment  Injection assessment: negative aspiration for heme, no paresthesia on injection and local visualized surrounding nerve on ultrasound  Paresthesia pain: none  Slow fractionated injection: yes  Hemodynamics: stable  Additional Notes  Immediately prior to procedure a \"time out\" was called to verify the correct patient, allergies, laterality, procedure and equipment. Time out performed with  RN    Local Anesthetic: 0.5 %  Bupivacaine   Amount: 20 ml  in 5 ml increments after negative aspiration each time. Anterior scalene and middle scalene muscles, 1st rib and pleura, brachial plexus (upper, middle and lower trunk) and Subclavian artery are identified, the tip of the needle and the spread of the local anesthetic around the brachial plexus are visualized. The Brachial plexus appeared to be anatomically normal and there were no abnormal pathologically findings seen.          Reason for block: post-op pain management and at surgeon's request

## 2022-01-12 NOTE — ANESTHESIA PRE PROCEDURE
Department of Anesthesiology  Preprocedure Note       Name:  Layne Dowell   Age:  55 y.o.  :  1975                                          MRN:  [id number]         Date:  2022      Surgeon: Alvina Coffman):  Gayla Falcon MD    Procedure: Procedure(s):  OPEN REDUCTION INTERNAL FIXATION RIGHT OLECRANON PROCESS FRACTURE  --BLOCK--    Medications prior to admission:   Prior to Admission medications    Medication Sig Start Date End Date Taking? Authorizing Provider   Escitalopram Oxalate (LEXAPRO PO) Take by mouth   Yes Historical Provider, MD       Current medications:    Current Facility-Administered Medications   Medication Dose Route Frequency Provider Last Rate Last Admin    ceFAZolin (ANCEF) 2000 mg in sterile water 20 mL IV syringe  2,000 mg IntraVENous Once Gayla Falcon MD        lactated ringers infusion   IntraVENous Continuous Jorge Smalls MD        sodium chloride flush 0.9 % injection 10 mL  10 mL IntraVENous 2 times per day Jorge Smalls MD        sodium chloride flush 0.9 % injection 10 mL  10 mL IntraVENous PRN Jorge Smalls MD        0.9 % sodium chloride infusion  25 mL IntraVENous PRN Jorge Smalls MD        famotidine (PEPCID) injection 20 mg  20 mg IntraVENous Once Jorge Smalls MD        lidocaine 1 % (PF) injection 0.1 mL  0.1 mL IntraDERmal Once Cornel Stapleton MD           Allergies:  No Known Allergies    Problem List:  There is no problem list on file for this patient. Past Medical History:  History reviewed. No pertinent past medical history. Past Surgical History:  History reviewed. No pertinent surgical history.     Social History:    Social History     Tobacco Use    Smoking status: Current Every Day Smoker     Packs/day: 1.00    Smokeless tobacco: Never Used   Substance Use Topics    Alcohol use: Not Currently                                Ready to quit: Not Answered  Counseling given: Not Answered      Vital Signs (Current): There were no vitals filed for this visit. BP Readings from Last 3 Encounters:   12/17/21 134/89       NPO Status: Time of last liquid consumption: 2200                        Time of last solid consumption: 1800                        Date of last liquid consumption: 01/11/22                        Date of last solid food consumption: 01/11/22    BMI:   Wt Readings from Last 3 Encounters:   12/23/21 230 lb (104.3 kg)   12/17/21 230 lb (104.3 kg)     There is no height or weight on file to calculate BMI.    CBC: No results found for: WBC, RBC, HGB, HCT, MCV, RDW, PLT    CMP:   Lab Results   Component Value Date     01/18/2011    K 4.2 01/18/2011     01/18/2011    CO2 27 01/18/2011    BUN 13 01/18/2011    CREATININE 1.0 01/18/2011    GFRAA >60 01/18/2011    GLUCOSE 100 01/18/2011    PROT 7.9 01/18/2011    CALCIUM 9.7 01/18/2011    BILITOT 0.70 01/18/2011    ALKPHOS 69 01/18/2011    AST 27 01/18/2011    ALT 35 01/18/2011       POC Tests: No results for input(s): POCGLU, POCNA, POCK, POCCL, POCBUN, POCHEMO, POCHCT in the last 72 hours.     Coags: No results found for: PROTIME, INR, APTT    HCG (If Applicable): No results found for: PREGTESTUR, PREGSERUM, HCG, HCGQUANT     ABGs: No results found for: PHART, PO2ART, UFV8NXY, PJX9CUL, BEART, F3AIKBNL     Type & Screen (If Applicable):  No results found for: LABABO, LABRH    Drug/Infectious Status (If Applicable):  No results found for: HIV, HEPCAB    COVID-19 Screening (If Applicable): No results found for: COVID19        Anesthesia Evaluation  Patient summary reviewed no history of anesthetic complications:   Airway: Mallampati: II  TM distance: >3 FB   Neck ROM: full  Mouth opening: > = 3 FB Dental: normal exam         Pulmonary:Negative Pulmonary ROS and normal exam  breath sounds clear to auscultation                             Cardiovascular:Negative CV ROS  Exercise tolerance: good (>4 METS),           Rhythm: regular  Rate: normal                    Neuro/Psych:   Negative Neuro/Psych ROS              GI/Hepatic/Renal: Neg GI/Hepatic/Renal ROS            Endo/Other: Negative Endo/Other ROS                    Abdominal:             Vascular: negative vascular ROS. Other Findings:          Pre-Operative Diagnosis: OLECRANON FRACTURE, RIGHT, CLOSED, INITIAL ENCOUNTERS    55 y.o.   BMI:  Body mass index is 28.75 kg/m². Vitals:    01/12/22 0652   BP: 118/85   Pulse: 54   Resp: 15   Temp: 98.6 °F (37 °C)   TempSrc: Temporal   SpO2: 99%   Weight: 230 lb (104.3 kg)   Height: 6' 3\" (1.905 m)       No Known Allergies    Social History     Tobacco Use    Smoking status: Current Every Day Smoker     Packs/day: 1.00    Smokeless tobacco: Never Used   Substance Use Topics    Alcohol use: Not Currently       LABS:    CBC  No results found for: WBC, HGB, HCT, PLT  RENAL  Lab Results   Component Value Date/Time     01/18/2011 01:45 PM    K 4.2 01/18/2011 01:45 PM     01/18/2011 01:45 PM    CO2 27 01/18/2011 01:45 PM    BUN 13 01/18/2011 01:45 PM    CREATININE 1.0 01/18/2011 01:45 PM    GLUCOSE 100 (H) 01/18/2011 01:45 PM     COAGS  No results found for: PROTIME, INR, APTT          Anesthesia Plan      general     ASA 1     (I discussed with the patient the risks and benefits of regional anesthesia (inlcuding infection, bleeding, damage to nerves and surrounding structures) PIV, General, IV Narcotics, PACU. All questions were answered the patient agrees with the plan and wishes to proceed.)  Induction: intravenous.                           Isha Diallo MD   1/12/2022

## 2022-01-13 ENCOUNTER — TELEPHONE (OUTPATIENT)
Dept: ORTHOPEDIC SURGERY | Age: 47
End: 2022-01-13

## 2022-01-13 NOTE — TELEPHONE ENCOUNTER
POST OP CALL: 1/12/22    SURGERY DATE:1/13/2022    SURGICAL PROCEDURE:OPEN REDUCTION INTERNAL FIXATION RIGHT OLECRANON PROCESS FRACTURE  -       Unable to reach pt is an inmate [address] [address] [facility] [address]:  1/25/22 [facility]

## 2022-01-25 ENCOUNTER — OFFICE VISIT (OUTPATIENT)
Dept: ORTHOPEDIC SURGERY | Age: 47
End: 2022-01-25

## 2022-01-25 DIAGNOSIS — Z98.890 STATUS POST OPEN REDUCTION AND INTERNAL FIXATION (ORIF) OF FRACTURE: Primary | ICD-10-CM

## 2022-01-25 DIAGNOSIS — S52.021P: ICD-10-CM

## 2022-01-25 DIAGNOSIS — T84.498A FAILED ORTHOPEDIC IMPLANT, INITIAL ENCOUNTER (HCC): ICD-10-CM

## 2022-01-25 DIAGNOSIS — Z87.81 STATUS POST OPEN REDUCTION AND INTERNAL FIXATION (ORIF) OF FRACTURE: Primary | ICD-10-CM

## 2022-01-25 PROCEDURE — 99024 POSTOP FOLLOW-UP VISIT: CPT | Performed by: ORTHOPAEDIC SURGERY

## 2022-01-25 NOTE — PROGRESS NOTES
[address] [facility]  Outpatient Progress Note  Vania Navarro MD    Patient Name: Amara Denton MRN: <[id number]>   Age: 55 y.o. YOB: 1975   Sex: male      [address] Complaint   Patient presents with    Post-Op Check     Right elbow orif sx 1/12/2022 1st PO       HISTORY OF PRESENT ILLNESS   Amara Denton is a 55 y.o. male  The patient presents for follow up on their fracture. He had undergone open reduction internal fixation of a right elbow olecranon process fracture on January 12, 2022. He presents to the office today for first postoperative visit. He admits that he has been removing his splint for showering and sometimes for sleeping. He remove the splint after his shower last night and did not reapply it. He presented to the office today without bandages or splint present on the right upper extremity. He reports some pain and some stiffness in the elbow. He denies reinjury. PAST MEDICAL HISTORY    No past medical history on file. PAST SURGICAL HISTORY     Past Surgical History:   Procedure Laterality Date    ELBOW SURGERY Right 1/12/2022    OPEN REDUCTION INTERNAL FIXATION RIGHT OLECRANON PROCESS FRACTURE  - performed by Jarad Naqvi MD at [address] [facility] OR       MEDICATIONS     Current Outpatient Medications   Medication Sig Dispense Refill    Escitalopram Oxalate (LEXAPRO PO) Take by mouth       No current facility-administered medications for this visit. ALLERGIES   No Known Allergies    FAMILY HISTORY   No family history on file.     SOCIAL HISTORY     Social History     Socioeconomic History    Marital status:      Spouse name: Not on file    Number of children: Not on file    Years of education: Not on file    Highest education level: Not on file   Occupational History    Not on file   Tobacco Use    Smoking status: Current Every Day Smoker     Packs/day: 1.00    Smokeless tobacco: Never Used   Substance and Sexual Activity    Alcohol use: Not Currently    Drug use: Not Currently    Sexual activity: Not on file   Other Topics Concern    Not on file   Social History Narrative    Not on file     Social Determinants of Health     Financial Resource Strain:     Difficulty of Paying Living Expenses: Not on file   Food Insecurity:     Worried About Running Out of Food in the Last Year: Not on file    Fuad of Food in the Last Year: Not on file   Transportation Needs:     Lack of Transportation (Medical): Not on file    Lack of Transportation (Non-Medical):  Not on file   Physical Activity:     Days of Exercise per Week: Not on file    Minutes of Exercise per Session: Not on file   Stress:     Feeling of Stress : Not on file   Social Connections:     Frequency of Communication with Friends and Family: Not on file    Frequency of Social Gatherings with Friends and Family: Not on file    Attends Confucianism Services: Not on file    Active Member of [address] [place] [address] or Organizations: Not on file    Attends Club or Organization Meetings: Not on file    Marital Status: Not on file   Intimate Partner Violence:     Fear of Current or Ex-Partner: Not on file    Emotionally Abused: Not on file    Physically Abused: Not on file    Sexually Abused: Not on file   Housing Stability:     Unable to Pay for Housing in the Last Year: Not on file    Number of [place] in the Last Year: Not on file    Unstable Housing in the Last Year: Not on file       REVIEW OF SYSTEMS   General: no fever, chills, night sweats, anorexia, malaise, fatigue, or weight change  Hematologic:  no unexplained bleeding or bruising  HEENT:   no nasal congestion, rhinorrhea, sore throat, or facial pain  Respiratory:  no cough, dyspnea, or chest pain  Cardiovascular:  no angina, PETERSEN, PND, orthopnea, dependent edema, or palpitations  Gastrointestinal:  no nausea, vomiting, diarrhea, constipation, or abdominal pain  Genitourinary:  no urinary urgency, frequency, dysuria, or hematuria  Musculoskeletal: see HPI  Endocrine:  no heat or cold intolerance and no polyphagia, polydipsia, or polyuria  Skin:  no skin eruptions or changing lesions  Neurologic:  no focal weakness, numbness/tingling, tremor, or severe headache. See HPI. See HPI for pertinent positives. PHYSICAL EXAM   Vital Signs: There were no vitals taken for this visit. General appearance: healthy, alert, no distress  Skin: Skin color, texture, turgor normal. No rashes or lesions  HEENT: atraumatic, normocephalic. PERRL  Respiratory: Unlabored breathing  Lymphatic: No adenopathy   Neuro: Alert and oriented, normal distal sensation, normal bilateral DTRs  Vascular: Normal distal capillary and distal pulses  Muskuloskeletal Exam:   Right elbow surgical incision is well-healed without signs of infection. There is some tenderness at the olecranon process. Range of motion of the elbow is 30 degrees of flexion to 120 degrees of flexion. He is neurovascularly intact    RADIOLOGY   X-rays obtained and reviewed in office:  Views 2 views of the right elbow taken in the office today    Impression: Compared to immediate postoperative films there has been loss of reduction of the olecranon process fracture. The intramedullary screw appears to have backed out and there is fracture gap of about 1 cm with incongruity of the olecranon articular surface    IMPRESSION     1. Status post open reduction and internal fixation (ORIF) of fracture    2. Closed fracture of olecranon process of right ulna with malunion, subsequent encounter    3. Failed orthopedic implant, initial encounter ([facility] [address]           PLAN   X-rays demonstrate loss of fixation of the fracture. The intra-articular incongruity is unacceptable. This is a difficult situation. The patient is still incarcerated at the [facility] facility. I cannot really determine how much noncompliance plays a role in failure of fixation.   My best recommendation is to revise fracture reduction and internal fixation. I explained the importance of compliance with postoperative protocols. We discussed the risk benefits complications possible outcomes and expected postoperative course. He understands would like to proceed. FOLLOWUP     Return for first post operative visit. Orders Placed This Encounter   Procedures    XR ELBOW RIGHT (2 VIEWS)     Standing Status:   Future     Number of Occurrences:   1     Standing Expiration Date:   1/25/2023     Order Specific Question:   Reason for exam:     Answer:   post op  fx      No orders of the defined types were placed in this encounter.       Patient was instructed on appropriate use of braces, participation in home exercise programs, healthy lifestyle choices and weight loss as appropriate     Ga Quintana MD

## 2022-01-25 NOTE — PATIENT INSTRUCTIONS
You are to be scheduled for surgery. My Patient , Sohail Estevez will be facilitating the scheduling process. You may continue normal activities up until the time of your surgery. Please call Jose Dye at [phone] if you have any questions regarding the scheduled procedure.

## 2022-01-25 NOTE — LETTER
[id number]   DR. DAVID CASTREJON     TODAY'S DATE: 22    PATIENT'S NAME: Vik Delgado    PATIENT'S NUMBER: <[id number]>    : 1975    PREFERRED PHONE NUMBER: There are no phone numbers on file. WORK PHONE NUMBER: There is no work phone number on file. DIAGNOSIS:   1. Status post open reduction and internal fixation (ORIF) of fracture    2. Closed fracture of olecranon process of right ulna with malunion, subsequent encounter    3.  Failed orthopedic implant, initial encounter ([facility] [address]        PROCEDURE: Revision open reduction internal fixation right olecranon process fracture    SURGEON: Dr. Del Urrutia: Urgent    HOSPITAL: Patient preference outpatient    ADMISSION STATUS: Outpatient/Same Day Surgery    ANESTHESIA: General   Pre-Op Block requested  Yes    LENGTH OF SURGERY: 1 hour    EQUIPMENT REQUESTED: Olecranon hook plate, 47-PZCLJ wire, 7.3 cannulated screws, Synthes locking small fragment set and Synthes cannulated screws      X-RAYS REQUIRED: C-ARM      PCP: Martina Bliss MD    H&P TO BE DONE BY THE PCP      CARDIAC CLEARANCE NEEDED: No     ALLERGIES: No Known Allergies    DME: none    POST-OP VISIT: 10 Days    OTHER INSTRUCTIONS/REMARKS:     INSURANCE INFORMATION: _________________________ CARD IN MEDIA IN EPIC___  CARD FAXED___    PRE-CERTIFICATION REQUIRED: YES   NO   PER _______________________    SURGERY SCHEDULED BY: ____________________________________    PATIENT CALLED AND CONFIRMED DATE & TIME: ______________________

## 2022-01-31 ENCOUNTER — TELEPHONE (OUTPATIENT)
Dept: ORTHOPEDIC SURGERY | Age: 47
End: 2022-01-31

## 2022-01-31 NOTE — TELEPHONE ENCOUNTER
PATIENT IS AN INMATE FROM [facility]  COULD  [facility]-[id number]  AND

## 2022-02-14 ENCOUNTER — TELEPHONE (OUTPATIENT)
Dept: ORTHOPEDIC SURGERY | Age: 47
End: 2022-02-14

## 2022-02-14 NOTE — TELEPHONE ENCOUNTER
Violet MCCLURE WITH [facility] CALLED.  STATES THAT PATIENT NO LONGER WANTS TO GO THROUGH WITH SX THAT IS SCHEDULED FOR 2/16'

## 2022-10-17 ENCOUNTER — TELEPHONE (OUTPATIENT)
Dept: ORTHOPEDIC SURGERY | Age: 47
End: 2022-10-17

## (undated) DEVICE — ARGYLE FRAZIER SURGICAL SUCTION INSTRUMENT 10 FR/CH (3.3 MM): Brand: ARGYLE

## (undated) DEVICE — ROYAL SILK SURGICAL GOWN, XL: Brand: CONVERTORS

## (undated) DEVICE — Device

## (undated) DEVICE — SOLUTION,SALINE,IRRGATION,500ML,STRL: Brand: MEDLINE

## (undated) DEVICE — BANDAGE COMPR W6INXL4.5YD LTWT E EC SGL LAYERED CLP CLSR

## (undated) DEVICE — COTTON UNDERCAST PADDING,CRIMPED FINISH: Brand: WEBRIL

## (undated) DEVICE — GUIDEWIRE ORTH DIA2.8MM 300/150MM CALIB W/ FLUT FOR 6.5MM

## (undated) DEVICE — BANDAGE COMPR W4INXL5YD TAN BRTH SELF ADH WRP W/ HND TEAR

## (undated) DEVICE — CURITY ABDOMINAL PAD: Brand: CURITY

## (undated) DEVICE — Z CONVERTED USE 2273164 BANDAGE COMPR W4INXL4 1/2YD E EC SGL LAYERED CLP CLSR ECONO

## (undated) DEVICE — PADDING CAST W4INXL4YD NONSTERILE COT RAYON MICROPLEATED

## (undated) DEVICE — STERILE LATEX POWDER-FREE SURGICAL GLOVESWITH NITRILE COATING: Brand: PROTEXIS

## (undated) DEVICE — SUTURE VCRL SZ 4-0 L27IN ABSRB UD L19MM FS-2 3/8 CIR REV J422H

## (undated) DEVICE — DRAPE C-ARM X-RAY

## (undated) DEVICE — PADDING CAST N ADH 12X6 IN CRIMPED FINISH 100% COTTON WBRLII

## (undated) DEVICE — SUTURE VCRL SZ 2-0 L18IN ABSRB UD CT-1 L36MM 1/2 CIR J839D

## (undated) DEVICE — BIT DRL L300MM DIA5MM CANN QUIK CPL ADJ STP REUSE

## (undated) DEVICE — 9165 UNIVERSAL PATIENT PLATE: Brand: 3M™

## (undated) DEVICE — SUTURE MCRYL SZ 4-0 L18IN ABSRB UD L19MM PS-2 3/8 CIR PRIM Y496G

## (undated) DEVICE — GLOVE SURG SZ 7 L12IN FNGR THK94MIL STD WHT ISOLEX LTX FREE

## (undated) DEVICE — SPLINT ORTH W4XL30IN LAYERED FBRGLS FOAM PD BRTH BK MOLD

## (undated) DEVICE — COTTON UNDERCAST PADDING,REGULAR FINISH: Brand: WEBRIL